# Patient Record
Sex: FEMALE | Race: WHITE | Employment: UNEMPLOYED | URBAN - METROPOLITAN AREA
[De-identification: names, ages, dates, MRNs, and addresses within clinical notes are randomized per-mention and may not be internally consistent; named-entity substitution may affect disease eponyms.]

---

## 2021-11-02 ENCOUNTER — OFFICE VISIT (OUTPATIENT)
Dept: OCCUPATIONAL MEDICINE | Age: 36
End: 2021-11-02
Attending: PHYSICIAN ASSISTANT

## 2021-11-02 ENCOUNTER — HOSPITAL ENCOUNTER (OUTPATIENT)
Dept: GENERAL RADIOLOGY | Age: 36
Discharge: HOME | End: 2021-11-02
Attending: PHYSICIAN ASSISTANT

## 2021-11-02 DIAGNOSIS — Z02.1 PRE-EMPLOYMENT EXAMINATION: ICD-10-CM

## 2021-11-02 DIAGNOSIS — Z02.1 PRE-EMPLOYMENT EXAMINATION: Primary | ICD-10-CM

## 2021-11-02 PROCEDURE — 72110 X-RAY EXAM L-2 SPINE 4/>VWS: CPT | Performed by: PHYSICIAN ASSISTANT

## 2024-08-06 ENCOUNTER — ORDER TRANSCRIPTION (OUTPATIENT)
Dept: ADMINISTRATIVE | Facility: HOSPITAL | Age: 39
End: 2024-08-06

## 2024-08-06 DIAGNOSIS — R00.2 PALPITATIONS: Primary | ICD-10-CM

## 2024-08-28 ENCOUNTER — HOSPITAL ENCOUNTER (OUTPATIENT)
Dept: CV DIAGNOSTICS | Age: 39
Discharge: HOME | End: 2024-08-28
Attending: NURSE PRACTITIONER
Payer: COMMERCIAL

## 2024-08-28 DIAGNOSIS — R00.2 PALPITATIONS: ICD-10-CM

## 2024-08-28 PROCEDURE — 93226 XTRNL ECG REC<48 HR SCAN A/R: CPT | Performed by: NURSE PRACTITIONER

## 2024-08-28 PROCEDURE — 93225 XTRNL ECG REC<48 HRS REC: CPT | Performed by: NURSE PRACTITIONER

## 2025-03-04 NOTE — H&P
Reviewed H&P. No changes. R/B/A D/W patient. Consent obtained. Proceed with angiogram as planned.

## 2025-03-10 ENCOUNTER — LAB ENCOUNTER (OUTPATIENT)
Dept: LAB | Age: 40
End: 2025-03-10
Attending: INTERNAL MEDICINE
Payer: COMMERCIAL

## 2025-03-10 DIAGNOSIS — R94.39 ABNORMAL STRESS TEST: ICD-10-CM

## 2025-03-10 DIAGNOSIS — R93.89 ABNORMAL COMPUTED TOMOGRAPHY ANGIOGRAPHY (CTA): ICD-10-CM

## 2025-03-10 DIAGNOSIS — Z01.818 PRE-OP TESTING: ICD-10-CM

## 2025-03-10 LAB
ANION GAP SERPL CALC-SCNC: 7 MMOL/L (ref 0–18)
BASOPHILS # BLD AUTO: 0.05 X10(3) UL (ref 0–0.2)
BASOPHILS NFR BLD AUTO: 0.4 %
BUN BLD-MCNC: 9 MG/DL (ref 9–23)
CALCIUM BLD-MCNC: 9.5 MG/DL (ref 8.7–10.6)
CHLORIDE SERPL-SCNC: 102 MMOL/L (ref 98–112)
CO2 SERPL-SCNC: 27 MMOL/L (ref 21–32)
CREAT BLD-MCNC: 0.81 MG/DL
EGFRCR SERPLBLD CKD-EPI 2021: 95 ML/MIN/1.73M2 (ref 60–?)
EOSINOPHIL # BLD AUTO: 0.07 X10(3) UL (ref 0–0.7)
EOSINOPHIL NFR BLD AUTO: 0.6 %
ERYTHROCYTE [DISTWIDTH] IN BLOOD BY AUTOMATED COUNT: 13 %
FASTING STATUS PATIENT QL REPORTED: NO
GLUCOSE BLD-MCNC: 92 MG/DL (ref 70–99)
HCT VFR BLD AUTO: 40.2 %
HGB BLD-MCNC: 13.5 G/DL
IMM GRANULOCYTES # BLD AUTO: 0.06 X10(3) UL (ref 0–1)
IMM GRANULOCYTES NFR BLD: 0.5 %
LYMPHOCYTES # BLD AUTO: 3.74 X10(3) UL (ref 1–4)
LYMPHOCYTES NFR BLD AUTO: 33.3 %
MCH RBC QN AUTO: 29.8 PG (ref 26–34)
MCHC RBC AUTO-ENTMCNC: 33.6 G/DL (ref 31–37)
MCV RBC AUTO: 88.7 FL
MONOCYTES # BLD AUTO: 0.95 X10(3) UL (ref 0.1–1)
MONOCYTES NFR BLD AUTO: 8.5 %
NEUTROPHILS # BLD AUTO: 6.36 X10 (3) UL (ref 1.5–7.7)
NEUTROPHILS # BLD AUTO: 6.36 X10(3) UL (ref 1.5–7.7)
NEUTROPHILS NFR BLD AUTO: 56.7 %
OSMOLALITY SERPL CALC.SUM OF ELEC: 280 MOSM/KG (ref 275–295)
PLATELET # BLD AUTO: 241 10(3)UL (ref 150–450)
POTASSIUM SERPL-SCNC: 3.8 MMOL/L (ref 3.5–5.1)
RBC # BLD AUTO: 4.53 X10(6)UL
SODIUM SERPL-SCNC: 136 MMOL/L (ref 136–145)
WBC # BLD AUTO: 11.2 X10(3) UL (ref 4–11)

## 2025-03-10 PROCEDURE — 36415 COLL VENOUS BLD VENIPUNCTURE: CPT

## 2025-03-10 PROCEDURE — 80048 BASIC METABOLIC PNL TOTAL CA: CPT

## 2025-03-10 PROCEDURE — 85025 COMPLETE CBC W/AUTO DIFF WBC: CPT

## 2025-03-10 RX ORDER — CHOLECALCIFEROL (VITAMIN D3) 25 MCG
1000 TABLET ORAL DAILY
COMMUNITY

## 2025-03-10 RX ORDER — AMOXICILLIN 500 MG
1 CAPSULE ORAL DAILY
COMMUNITY

## 2025-03-10 RX ORDER — METOPROLOL SUCCINATE 50 MG/1
50 TABLET, EXTENDED RELEASE ORAL DAILY
COMMUNITY

## 2025-03-10 RX ORDER — LOSARTAN POTASSIUM 50 MG/1
50 TABLET ORAL DAILY
COMMUNITY

## 2025-03-10 NOTE — PAT NURSING NOTE
PAT call with patient. The following instructions were given and sent through her My Chart. She states she takes all medications in the evening. Questions were answered and she verbalized understanding. Reminded her to have labs done prior to the procedure.    PreOp Instructions    You are scheduled for: a Cardiac Procedure    Date of Procedure: 03/14/25    Diet Instructions: Do not eat or drink anything after midnight including gum, mints, candy, etc.    Medications to Stop: Hold herbal supplements and vitamins    Skin Prep : Shower with antibacterial soap using a clean washcloth, prior to procedure. Once dried off, no lotions/powders/creams/ointments, etc.    Arrival Time: The day prior to your procedure you will receive a phone call before 6:00 pm with your arrival time. If you haven't received a phone call, please check your voicemail messages., If you did not receive a voice mail and it is after 6:00 pm, please call the nursing supervisor at [phone].    Driving After Procedure: Sedation will be given so you WILL NOT be able to drive home. You will need a responsible adult  to drive you home. You can NOT take uber or taxi unless approved by your physician in advance.    Discharge Teaching: Your nurse will give you specific instructions before discharge, Any questions, please call the physician's office       parking is available starting at 6 am or park in the [address] parking garage at [facility]. Check in at the [facility] reception desk. Our  will be there to check you in for your procedure. Please bring your insurance cards and ID with you.

## 2025-03-14 ENCOUNTER — HOSPITAL ENCOUNTER (OUTPATIENT)
Dept: INTERVENTIONAL RADIOLOGY/VASCULAR | Facility: HOSPITAL | Age: 40
Discharge: HOME | End: 2025-03-14
Attending: INTERNAL MEDICINE | Admitting: INTERNAL MEDICINE
Payer: COMMERCIAL

## 2025-03-14 VITALS
DIASTOLIC BLOOD PRESSURE: 58 MMHG | HEART RATE: 59 BPM | RESPIRATION RATE: 10 BRPM | OXYGEN SATURATION: 98 % | TEMPERATURE: 98 F | HEIGHT: 64 IN | WEIGHT: 215 LBS | SYSTOLIC BLOOD PRESSURE: 98 MMHG | BODY MASS INDEX: 36.7 KG/M2

## 2025-03-14 DIAGNOSIS — R06.02 SOB (SHORTNESS OF BREATH): ICD-10-CM

## 2025-03-14 DIAGNOSIS — R94.39 ABNORMAL STRESS TEST: ICD-10-CM

## 2025-03-14 DIAGNOSIS — R93.89 ABNORMAL COMPUTED TOMOGRAPHY ANGIOGRAPHY (CTA): Primary | ICD-10-CM

## 2025-03-14 DIAGNOSIS — Z01.818 PRE-OP TESTING: ICD-10-CM

## 2025-03-14 LAB — B-HCG UR QL: NEGATIVE

## 2025-03-14 PROCEDURE — 4A023N7 MEASUREMENT OF CARDIAC SAMPLING AND PRESSURE, LEFT HEART, PERCUTANEOUS APPROACH: ICD-10-PCS | Performed by: INTERNAL MEDICINE

## 2025-03-14 PROCEDURE — B2111ZZ FLUOROSCOPY OF MULTIPLE CORONARY ARTERIES USING LOW OSMOLAR CONTRAST: ICD-10-PCS | Performed by: INTERNAL MEDICINE

## 2025-03-14 PROCEDURE — 99152 MOD SED SAME PHYS/QHP 5/>YRS: CPT | Performed by: INTERNAL MEDICINE

## 2025-03-14 PROCEDURE — 93458 L HRT ARTERY/VENTRICLE ANGIO: CPT | Performed by: INTERNAL MEDICINE

## 2025-03-14 PROCEDURE — 81025 URINE PREGNANCY TEST: CPT

## 2025-03-14 RX ORDER — NITROGLYCERIN 20 MG/100ML
INJECTION INTRAVENOUS
Status: COMPLETED
Start: 2025-03-14 | End: 2025-03-14

## 2025-03-14 RX ORDER — SODIUM CHLORIDE 9 MG/ML
INJECTION, SOLUTION INTRAVENOUS
Status: DISCONTINUED | OUTPATIENT
Start: 2025-03-15 | End: 2025-03-14 | Stop reason: HOSPADM

## 2025-03-14 RX ORDER — HEPARIN SODIUM 5000 [USP'U]/ML
INJECTION, SOLUTION INTRAVENOUS; SUBCUTANEOUS
Status: COMPLETED
Start: 2025-03-14 | End: 2025-03-14

## 2025-03-14 RX ORDER — ASPIRIN 81 MG/1
324 TABLET, CHEWABLE ORAL ONCE
Status: DISCONTINUED | OUTPATIENT
Start: 2025-03-14 | End: 2025-03-14 | Stop reason: HOSPADM

## 2025-03-14 RX ORDER — VERAPAMIL HYDROCHLORIDE 2.5 MG/ML
INJECTION, SOLUTION INTRAVENOUS
Status: COMPLETED
Start: 2025-03-14 | End: 2025-03-14

## 2025-03-14 RX ORDER — MIDAZOLAM HYDROCHLORIDE 1 MG/ML
INJECTION INTRAMUSCULAR; INTRAVENOUS
Status: COMPLETED
Start: 2025-03-14 | End: 2025-03-14

## 2025-03-14 RX ORDER — ASPIRIN 81 MG/1
TABLET, CHEWABLE ORAL
Status: COMPLETED
Start: 2025-03-14 | End: 2025-03-14

## 2025-03-14 RX ORDER — LIDOCAINE HYDROCHLORIDE 10 MG/ML
INJECTION, SOLUTION EPIDURAL; INFILTRATION; INTRACAUDAL; PERINEURAL
Status: COMPLETED
Start: 2025-03-14 | End: 2025-03-14

## 2025-03-14 RX ORDER — MIDAZOLAM HYDROCHLORIDE 1 MG/ML
INJECTION INTRAMUSCULAR; INTRAVENOUS
Status: DISCONTINUED
Start: 2025-03-14 | End: 2025-03-14 | Stop reason: WASHOUT

## 2025-03-14 NOTE — DISCHARGE INSTRUCTIONS
0700- RN placed patient on bed pan to have a bowel movement. Patient passed small stool. RN assisted in AM hygiene care, marino care, and replaced linens. Patient resting in bed. HOME CARE INSTRUCTIONS FOLLOWING CORONARY ANGIOGRAPHY,  PERIPHERAL ANGIOGRAPHY, ANGIOPLASTY (PTCA/PTA) OR INSERTION  OF STENT IN THE CORONARY, CAROTID, AND/OR PERIPHERAL ARTERIES      Activity:   DO NOT drive after the procedure. You may resume driving late the following day according to the nurse or physician’s instructions   Plan on resting and relaxing tonight and tomorrow   Resume your normal activity after 48 hours, or as instructed by your physician   Do not lift anything over 10 pounds for the next 24 hours   Avoid sexual activity for the next 24 hours   Avoid drinking alcohol for the next 24 hours   If the wrist was used, avoid bending/flexing of the wrist for the next 24 hours.       What is Normal?   A small lump at the procedure site associated with mild tenderness when touched   The procedure site may be bruised or discolored   There may be a small amount of drainage on the bandage    Special Instructions:   Drink plenty of fluids during the next 24 hours to “flush” the contrast from your system   The bandage is to remain in place for 24 hours   Keep the bandage clean and dry   DO NOT submerge the procedure site for 72 hours (no bath tubs or pools). This includes dishwashing/submersion of the wrist, if the wrist was used   After 24 hours, you must remove the bandage   You should shower after removing the bandage, and wash the procedure site gently with soap and water   If you choose to wear a bandage for a few days, make sure it remains clean and dry and that it is changed daily    When you should NOTIFY YOUR PHYSICIAN:   Bleeding can occur at the procedure site - both on the outside of the skin and/or beneath the surface of the skin   Swelling or a large lump at the procedure site can occur, which may be accompanied by moderate to severe pain. If either of the above occurs, lie down flat. Have someone apply pressure to the procedure site with both hands, as instructed by the nurse. Hold pressure for 20  minutes and the bleeding should stop. Notify your physician of the occurrence. If the bleeding does not stop, call 911 and continue to apply pressure   If you experience signs of a fever, temperature >101 degrees, chills, infection (redness, swelling, thick yellow drainage, or a foul odor from the procedure site)   If you notice any numbness, tingling, or loss of feeling to your fingers or hand, if wrist access was utilized    Other:  - You may resume your present diet, unless otherwise specified by your physician.   - You may resume all of your medications as prescribed, unless otherwise directed by your physician. A list of your medications was provided to you at discharge.  - Do not make any personal/business decisions and/or sign any legal documents for the next 24 hours.

## 2025-03-14 NOTE — PROCEDURES
[facility]   part of [facility]    Cardiac Catheterization Note    Primary Proceduralist: Aleah Chavez MD  Procedure Performed: [facility]  Date of Procedure: 3/14/2025   Indication: Abnormal CCTA  Pre Operative Diagnosis: Same  Post Operative Diagnosis: Mild CAD  Estimated blood loss: 10cc  Specimens: None    Consent obtained from the patient and documented in the paper chart, unless verbally obtained in an emergency setting.  The benefits and risk of the procedure, including but not limited to infection, bleeding, myocardial infarction, stroke, vascular injury, emergency surgery, renal failure requiring dialysis and death were discussed with the patient. These complications occur in approximately 1-2% of elective procedures, but the risk may be significantly elevated in the setting of acute coronary syndrome, electrical or hemodynamic instability, multivessel disease, reduced LVEF or . The patient consented to any additional procedures performed emergently in order to address a complication or prevent medical deterioration. Viable alternatives were explained to the patient, including continuing medical therapy, with the risks incurred along that course.      Procedure/Technique:    Lidocaine 1% was administered locally. Access of right radial artery obtained using Seldinger technique. Ultrasound was not used .     6 Fr Sheath was inserted. J-wire advanced into the aorta under fluoroscopy.    Left coronary system engaged using 6 Fr JL 3.5. Selective angiogram performed.     Right coronary system engaged using 6 Fr JR4.  Selective angiogram performed. Catheter advanced into the LV. Hemodynamics were obtained.    Coronary Angiogram Findings:  LM: Large caliber artery giving rise to LAD & LCX. No significant stenosis.  LAD: Large caliber artery giving rise to diagonal and septal branches. Mild diffuse disease with 30% mid LAD stenosis, and segment of bridging.  LCX: Medium to large  caliber artery giving rise to OM branches. No significant stenosis.  RCA: Large caliber artery giving rise to acute marginal branches, and bifurcates into RPDA & RPL. No significant stenosis. Right dominant circulation.    Hemodynamics:  /6, LVEDP 14  AO 88/52, mean 68  No significant gradient upon Ao-LV pullback    Intervention:  None    Monitored sedation administered by the cath lab RN, and supervised throughout the procedure by myself. Total time 13 minutes.     Closure: Radial band.    No immediate complications.    A/P:  Mild CAD including mild diffuse disease with 30% mid LAD stenosis and bridging.  Otherwise no significant stenosis.  Medical management.      Aleah Chavez MD  Interventional Cardiology  [facility] [place]